# Patient Record
Sex: FEMALE | Race: BLACK OR AFRICAN AMERICAN | NOT HISPANIC OR LATINO | Employment: UNEMPLOYED | URBAN - METROPOLITAN AREA
[De-identification: names, ages, dates, MRNs, and addresses within clinical notes are randomized per-mention and may not be internally consistent; named-entity substitution may affect disease eponyms.]

---

## 2019-09-27 ENCOUNTER — HOSPITAL ENCOUNTER (OUTPATIENT)
Dept: TELEMEDICINE | Facility: HOSPITAL | Age: 64
Discharge: HOME | End: 2019-09-27
Payer: MEDICARE

## 2019-09-27 DIAGNOSIS — I67.4 HYPERTENSIVE ENCEPHALOPATHY: ICD-10-CM

## 2019-09-27 PROCEDURE — G0426 PR INPT TELEHEALTH CONSULT 50M: ICD-10-PCS | Mod: GT,,, | Performed by: PSYCHIATRY & NEUROLOGY

## 2019-09-27 PROCEDURE — G0426 INPT/ED TELECONSULT50: HCPCS | Mod: GT,,, | Performed by: PSYCHIATRY & NEUROLOGY

## 2019-09-27 NOTE — CONSULTS
[facility]  Vascular Neurology  Comprehensive Stroke Center  Tele-Consultation Note      Consults    Consulting Provider: LB SAINZ  Current Providers  No providers found    Patient Location: [facility] - [facility] Emergency Department  Spoke hospital nurse at bedside with patient assisting consultant.     Patient information was obtained from patient and relative(s).         Assessment/Plan:     STROKE DOCUMENTATION     Acute Stroke Times:   Acute Stroke Times   Last Known Normal Date: 09/27/19  Last Known Normal Time: 1200  Symptom Onset Date: 09/27/19  Symptom Onset Time: 1200  Stroke Team Called Date: 09/27/19  Stroke Team Called Time: 1533  Stroke Team Arrival Date: 09/27/19  Stroke Team Arrival Time: 1534  CT Interpretation Time: 1534    NIH Scale:  Interval: baseline  1a. Level of Consciousness: 0-->Alert, keenly responsive  1b. LOC Questions: 0-->Answers both questions correctly  1c. LOC Commands: 0-->Performs both tasks correctly  2. Best Gaze: 0-->Normal  3. Visual: 0-->No visual loss  4. Facial Palsy: 0-->Normal symmetrical movements  5a. Motor Arm, Left: 0-->No drift, limb holds 90 (or 45) degrees for full 10 secs  5b. Motor Arm, Right: 0-->No drift, limb holds 90 (or 45) degrees for full 10 secs  6a. Motor Leg, Left: 1-->Drift, leg falls by the end of the 5-sec period but does not hit bed  6b. Motor Leg, Right: 0-->No drift, leg holds 30 degree position for full 5 secs  7. Limb Ataxia: 0-->Absent  8. Sensory: 1-->Mild-to-moderate sensory loss, patient feels pinprick is less sharp or is dull on the affected side, or there is a loss of superficial pain with pinprick, but patient is aware of being touched  9. Best Language: 0-->No aphasia, normal  10. Dysarthria: 0-->Normal  11. Extinction and Inattention (formerly Neglect): 0-->No abnormality  Total (NIH Stroke Scale): 2     Modified Banks Score: 0  [place] Coma Scale:     ABCD2 Score:    [id number] Score:   HAS -BLED Score:   ICH Score:   Hunt & Baig Classification:       Diagnoses:   Hypertensive encephalopathy  Hypertensive encephalopathy  Labetalol 20 mg IV now then Cardene drip if necessary.  Admit to address accelerated hypertension.  MRI brain to make sure there is no stroke.          There were no vitals taken for this visit.  Alteplase Eligible?: No  Alteplase Recommendation: Alteplase not recommended due to minimal deficit   Possible Interventional Revascularization Candidate? No; No ischemic penumbra    Disposition Recommendation: admit to inpatient  do not transfer    Subjective:     History of Present Illness:  63 y/o female LKN 1200. Fell at home and unable to get up but did not have unilateral weakness except LLE which is quite swollen and numb. At 1330 noted to have difficulty expressing her thoughts and c/i severe HA. Brought to ED where BP found to be 276/141.      Woke up with symptoms?: no    Recent bleeding noted: no  Does the patient take any Blood Thinners? no  Medications: No relevant medications      Past Medical History: hypertension, diabetes and kidney problems/dialysis    Past Surgical History: no major surgeries within the last 2 weeks    Family History: no relevant history    Social History: smoker (active)    Allergies:  No known drug allergies    Review of Systems   Neurological: Positive for facial asymmetry, speech difficulty, weakness and numbness.   All other systems reviewed and are negative.    Objective:   Vitals:  BP: 212/138 and Heart Rate: 92    CT READ: Yes  No hemmorhage. No mass effect. No early infarct signs.     Physical Exam   Constitutional: She is oriented to person, place, and time. She appears well-developed and well-nourished.   HENT:   Head: Normocephalic and atraumatic.   Eyes: Pupils are equal, round, and reactive to light. EOM are normal.   Cardiovascular: Normal rate and regular rhythm.   Pulmonary/Chest: Effort normal.    Neurological: She is alert and oriented to person, place, and time. A cranial nerve deficit and sensory deficit is present.   Vitals reviewed.            Recommended the emergency room physician to have a brief discussion with the patient and/or family if available regarding the risks and benefits of treatment, and to briefly document the occurrence of that discussion in his clinical encounter note.     The attending portion of this evaluation, treatment, and documentation was performed per Keisha Chi MD via audiovisual.    Billing code:  (non-intervention mild to moderate stroke, TIA, some mimics)    · This patient has a critical neurological condition/illness, with some potential for high morbidity and mortality.  · There is a moderate probability for acute neurological change leading to clinical and possibly life-threatening deterioration requiring highest level of physician preparedness for urgent intervention.  · Care was coordinated with other physicians involved in the patient's care.  · Radiologic studies and laboratory data were reviewed and interpreted, and plan of care was re-assessed based on the results.  · Diagnosis, treatment options and prognosis may have been discussed with the patient and/or family members or caregiver.      In your opinion, this was a: Tier 1 Van Negative    Consult End Time: 4:02 PM     Keisha Chi MD  Comprehensive Stroke Center  Vascular Neurology   [facility]

## 2019-09-27 NOTE — ASSESSMENT & PLAN NOTE
Hypertensive encephalopathy  Labetalol 20 mg IV now then Cardene drip if necessary.  Admit to address accelerated hypertension.  MRI brain to make sure there is no stroke.

## 2019-09-27 NOTE — HPI
65 y/o female LKN 1200. Fell at home and unable to get up but did not have unilateral weakness except LLE which is quite swollen and numb. At 1330 noted to have difficulty expressing her thoughts and c/i severe HA. Brought to ED where BP found to be 276/141.

## 2019-09-27 NOTE — SUBJECTIVE & OBJECTIVE
Woke up with symptoms?: no    Recent bleeding noted: no  Does the patient take any Blood Thinners? no  Medications: No relevant medications      Past Medical History: hypertension, diabetes and kidney problems/dialysis    Past Surgical History: no major surgeries within the last 2 weeks    Family History: no relevant history    Social History: smoker (active)    Allergies:  No known drug allergies    Review of Systems   Neurological: Positive for facial asymmetry, speech difficulty, weakness and numbness.   All other systems reviewed and are negative.    Objective:   Vitals:  BP: 212/138 and Heart Rate: 92    CT READ: Yes  No hemmorhage. No mass effect. No early infarct signs.     Physical Exam   Constitutional: She is oriented to person, place, and time. She appears well-developed and well-nourished.   HENT:   Head: Normocephalic and atraumatic.   Eyes: Pupils are equal, round, and reactive to light. EOM are normal.   Cardiovascular: Normal rate and regular rhythm.   Pulmonary/Chest: Effort normal.   Neurological: She is alert and oriented to person, place, and time. A cranial nerve deficit and sensory deficit is present.   Vitals reviewed.

## 2020-12-03 PROBLEM — R63.4 WEIGHT LOSS: Status: ACTIVE | Noted: 2020-12-03
